# Patient Record
Sex: MALE | Race: WHITE
[De-identification: names, ages, dates, MRNs, and addresses within clinical notes are randomized per-mention and may not be internally consistent; named-entity substitution may affect disease eponyms.]

---

## 2021-08-25 ENCOUNTER — HOSPITAL ENCOUNTER (OUTPATIENT)
Dept: HOSPITAL 47 - RADXRYALE | Age: 51
Discharge: HOME | End: 2021-08-25
Attending: PHYSICIAN ASSISTANT
Payer: COMMERCIAL

## 2021-08-25 DIAGNOSIS — M79.644: Primary | ICD-10-CM

## 2021-08-26 NOTE — XR
Right finger

 

HISTORY: [id number] RT RING FINGER PAIN

 

 

2 views of the fourth digit of the right hand submitted

 

No comparisons

 

Bone mineralization, joint spaces and alignment are maintained.

 

IMPRESSION: No fracture or dislocation.

## 2021-09-30 ENCOUNTER — HOSPITAL ENCOUNTER (OUTPATIENT)
Dept: HOSPITAL 47 - RADXRYALE | Age: 51
Discharge: HOME | End: 2021-09-30
Attending: PHYSICIAN ASSISTANT
Payer: COMMERCIAL

## 2021-09-30 DIAGNOSIS — M25.511: Primary | ICD-10-CM
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
